# Patient Record
Sex: MALE | Race: WHITE | Employment: PART TIME | URBAN - METROPOLITAN AREA
[De-identification: names, ages, dates, MRNs, and addresses within clinical notes are randomized per-mention and may not be internally consistent; named-entity substitution may affect disease eponyms.]

---

## 2018-08-28 ENCOUNTER — HOSPITAL ENCOUNTER (OUTPATIENT)
Age: 48
Discharge: HOME | End: 2018-08-28
Payer: COMMERCIAL

## 2018-08-28 LAB
ALBUMIN SERPL-MCNC: 4.1 G/DL (ref 3.5–5.2)
ALP BLD-CCNC: 61 U/L (ref 40–129)
ALT SERPL-CCNC: 19 U/L (ref 0–40)
ANION GAP SERPL CALCULATED.3IONS-SCNC: 14 MMOL/L (ref 7–16)
AST SERPL-CCNC: 19 U/L (ref 0–39)
BASOPHILS ABSOLUTE: 0.04 E9/L (ref 0–0.2)
BASOPHILS RELATIVE PERCENT: 0.6 % (ref 0–2)
BILIRUB SERPL-MCNC: 0.7 MG/DL (ref 0–1.2)
BILIRUBIN URINE: NEGATIVE
BLOOD, URINE: NEGATIVE
BUN BLDV-MCNC: 16 MG/DL (ref 6–20)
CALCIUM SERPL-MCNC: 8.8 MG/DL (ref 8.6–10.2)
CHLORIDE BLD-SCNC: 102 MMOL/L (ref 98–107)
CHOLESTEROL, FASTING: 197 MG/DL (ref 0–199)
CLARITY: CLEAR
CO2: 24 MMOL/L (ref 22–29)
COLOR: YELLOW
CREAT SERPL-MCNC: 0.9 MG/DL (ref 0.7–1.2)
EOSINOPHILS ABSOLUTE: 0.1 E9/L (ref 0.05–0.5)
EOSINOPHILS RELATIVE PERCENT: 1.4 % (ref 0–6)
GFR AFRICAN AMERICAN: >60
GFR NON-AFRICAN AMERICAN: >60 ML/MIN/1.73
GLUCOSE FASTING: 105 MG/DL (ref 74–109)
GLUCOSE URINE: NEGATIVE MG/DL
HCT VFR BLD CALC: 47 % (ref 37–54)
HDLC SERPL-MCNC: 36 MG/DL
HEMOGLOBIN: 16 G/DL (ref 12.5–16.5)
IMMATURE GRANULOCYTES #: 0.01 E9/L
IMMATURE GRANULOCYTES %: 0.1 % (ref 0–5)
KETONES, URINE: NEGATIVE MG/DL
LDL CHOLESTEROL CALCULATED: 116 MG/DL (ref 0–99)
LEUKOCYTE ESTERASE, URINE: NEGATIVE
LYMPHOCYTES ABSOLUTE: 2.87 E9/L (ref 1.5–4)
LYMPHOCYTES RELATIVE PERCENT: 40.4 % (ref 20–42)
MCH RBC QN AUTO: 27.8 PG (ref 26–35)
MCHC RBC AUTO-ENTMCNC: 34 % (ref 32–34.5)
MCV RBC AUTO: 81.7 FL (ref 80–99.9)
MONOCYTES ABSOLUTE: 0.45 E9/L (ref 0.1–0.95)
MONOCYTES RELATIVE PERCENT: 6.3 % (ref 2–12)
NEUTROPHILS ABSOLUTE: 3.63 E9/L (ref 1.8–7.3)
NEUTROPHILS RELATIVE PERCENT: 51.2 % (ref 43–80)
NITRITE, URINE: NEGATIVE
PDW BLD-RTO: 12.8 FL (ref 11.5–15)
PH UA: 6 (ref 5–9)
PLATELET # BLD: 279 E9/L (ref 130–450)
PMV BLD AUTO: 9.5 FL (ref 7–12)
POTASSIUM SERPL-SCNC: 4.2 MMOL/L (ref 3.5–5)
PROSTATE SPECIFIC ANTIGEN: 0.58 NG/ML (ref 0–4)
PROTEIN UA: NEGATIVE MG/DL
RBC # BLD: 5.75 E12/L (ref 3.8–5.8)
SODIUM BLD-SCNC: 140 MMOL/L (ref 132–146)
SPECIFIC GRAVITY UA: 1.02 (ref 1–1.03)
TOTAL PROTEIN: 7 G/DL (ref 6.4–8.3)
TRIGLYCERIDE, FASTING: 223 MG/DL (ref 0–149)
TSH SERPL DL<=0.05 MIU/L-ACNC: 1.46 UIU/ML (ref 0.27–4.2)
UROBILINOGEN, URINE: 0.2 E.U./DL
VITAMIN D 25-HYDROXY: 26 NG/ML (ref 30–100)
VLDLC SERPL CALC-MCNC: 45 MG/DL
WBC # BLD: 7.1 E9/L (ref 4.5–11.5)

## 2018-08-28 PROCEDURE — 80061 LIPID PANEL: CPT

## 2018-08-28 PROCEDURE — 36415 COLL VENOUS BLD VENIPUNCTURE: CPT

## 2018-08-28 PROCEDURE — 85025 COMPLETE CBC W/AUTO DIFF WBC: CPT

## 2018-08-28 PROCEDURE — 82306 VITAMIN D 25 HYDROXY: CPT

## 2018-08-28 PROCEDURE — 84443 ASSAY THYROID STIM HORMONE: CPT

## 2018-08-28 PROCEDURE — 81003 URINALYSIS AUTO W/O SCOPE: CPT

## 2018-08-28 PROCEDURE — 80053 COMPREHEN METABOLIC PANEL: CPT

## 2018-08-28 PROCEDURE — G0103 PSA SCREENING: HCPCS

## 2019-08-19 ENCOUNTER — HOSPITAL ENCOUNTER (OUTPATIENT)
Dept: PHYSICAL THERAPY | Age: 49
Setting detail: THERAPIES SERIES
Discharge: HOME | End: 2019-08-19
Payer: COMMERCIAL

## 2019-08-19 PROCEDURE — 97161 PT EVAL LOW COMPLEX 20 MIN: CPT | Performed by: PHYSICAL THERAPIST

## 2019-08-19 PROCEDURE — 97035 APP MDLTY 1+ULTRASOUND EA 15: CPT | Performed by: PHYSICAL THERAPIST

## 2019-08-19 PROCEDURE — 97110 THERAPEUTIC EXERCISES: CPT | Performed by: PHYSICAL THERAPIST

## 2019-08-19 NOTE — PROGRESS NOTES
by 1/3 mm grade  4. Independent with Home Exercise Programs      Long Term goals (6 weeks)  1. Decrease reported pain to 0-2/10  2. Increase ROM to WNL  3. Increase Strength to WNL   4. Able to perform/complete the following functions/tasks: lifting, pushing, pulling, grooming, bathing, reaching  5. Independent with Home Exercise Programs    Outcome Measure:  DASH= 41, 68%    Rehab Potential: [x] Good  [] Fair  [] Poor    PLAN       Treatment Plan:  [x] Therapeutic Exercise  [x] Therapeutic Activity  [x] Neuromuscular Re-education   [] Gait Training  [] Balance Training  [] Aerobic conditioning  [] Manual Therapy  [] Massage/Fascial release   [] Work/Sport specific activities   [x] Cold/hotpack  [] Vasocompression  [] Electrical Stimulation  [] Lumbar/Cervical Traction  [x] Ultrasound   [] Iontophoresis: 4 mg/mL Dexamethasone Sodium Phosphate 40-80 mAmin    [x] Instruction in HEP      []  Medication allergies reviewed for use of Dexamethasone Sodium Phosphate 4mg/ml  with iontophoresis treatments. Patient is not allergic. Suggested Professional Referral: [x] No  [] Yes:     Patient Education:  [x] Plans/Goals, Risks/Benefits discussed  [x] Home exercise program  Method of Education: [x] Verbal  [x] Demo  [x] Written  Comprehension of Education:  [x] Verbalizes understanding. [x] Demonstrates understanding. [] Needs Review. [] Demonstrates/verbalizes understanding of HEP/Ed previously given. Frequency:  2 times per week for  6 weeks    Patient understands diagnosis/prognosis and consents to treatment, plan and goals: [x] Yes    [] No     Thank you for the opportunity to work with your patient. If you have questions or comments, please contact me at [phone]; fax: [phone].     Electronically signed by: Zara Knight PT         Please sign Physician's Certification and return to:  [address] [place] [address] [place] [address]  Dept: [phone]  Dept Fax: [phone]  Loc: [phone]    [id number] Certification / Comments     Frequency/Duration  2/ week for  6 weeks. Certification period From: 8/19/2019  To: 10/19/2019    I have reviewed the Plan of Care established for skilled therapy services and certify that the services are required and that they will be provided while the patient is under my care.     Physician's Comments/Revisions:               Physician's Printed Name:                                           [de-identified] Signature:                                                               Date:

## 2019-08-23 ENCOUNTER — HOSPITAL ENCOUNTER (OUTPATIENT)
Dept: PHYSICAL THERAPY | Age: 49
Setting detail: THERAPIES SERIES
Discharge: HOME | End: 2019-08-23
Payer: COMMERCIAL

## 2019-08-23 PROCEDURE — 97110 THERAPEUTIC EXERCISES: CPT | Performed by: PHYSICAL THERAPIST

## 2019-08-23 PROCEDURE — 97035 APP MDLTY 1+ULTRASOUND EA 15: CPT | Performed by: PHYSICAL THERAPIST

## 2019-08-23 PROCEDURE — 97530 THERAPEUTIC ACTIVITIES: CPT | Performed by: PHYSICAL THERAPIST

## 2019-08-23 NOTE — PROGRESS NOTES
Physical Therapy Daily Treatment Note    Date: 2019  Patient Name: Marcelle Washburn  : 1970   MRN: [id number]     Referring Provider: Edith Hinojosa MD  [address], [address]     Medical Diagnosis: Left TOS, lateral epicondylitis    Outcome Measure:  DASH= 41, 68%  S: Pt reports compliance with HEP. Pt has a extremely difficult time supinating LUE  O:  DX Left TOS, lateral epicondylitis 2x/wk, 6 weeks   Time in / time out 13:00-13:53    Visit #/Visits remaining 2/10               pain 6/10     left Lateral epicondyle  1MHZ/1.3 w/cm2/pw X 8 min    THEREX           Biceps curls G x 20    Triceps G x 20    Shrugs          ER     IR     THERAPEUTIC ACTIVITY Functional activities   To aid in ROM and strength needed for reaching , lifting ,pushing and pulling at home/work    Large, functional, dynamic, global movements used to build strength, balance, endurance, and flexibility and to improve physical performance. Mid row G x 20    Low Row     Punches G x 20    Sh flex DB     Sh abd DB     WRIST DB  Flex  Ext  Sup/pron  UD  RD   UA  3# x 20  3# x 20  G x 20  3# x 20                                         A:  Tolerated well. Above added to written HEP.   P: Continue with rehab plan  Tanya Cason PT    Treatment Charges: Mins Units   Initial Evaluation      Re-Evaluation     Ther Exercise         TE 35  2    Manual Therapy     MT     Ther Activities        TA 10 1   Gait Training          GT     Neuro Re-education NR     Modalities 8  1    Non-Billable Service Time     Other     Total Time/Units  53 4

## 2019-08-26 ENCOUNTER — HOSPITAL ENCOUNTER (OUTPATIENT)
Dept: PHYSICAL THERAPY | Age: 49
Setting detail: THERAPIES SERIES
Discharge: HOME | End: 2019-08-26
Payer: COMMERCIAL

## 2019-08-26 PROCEDURE — 97110 THERAPEUTIC EXERCISES: CPT | Performed by: PHYSICAL THERAPIST

## 2019-08-26 PROCEDURE — 97035 APP MDLTY 1+ULTRASOUND EA 15: CPT | Performed by: PHYSICAL THERAPIST

## 2019-08-26 PROCEDURE — 97530 THERAPEUTIC ACTIVITIES: CPT | Performed by: PHYSICAL THERAPIST

## 2019-08-26 NOTE — PROGRESS NOTES
Physical Therapy Daily Treatment Note    Date: 2019  Patient Name: Kristen Little  : 1970   MRN: [id number]     Referring Provider: Karla Wilkins MD  [address], [address]     Medical Diagnosis: Left TOS, lateral epicondylitis    Outcome Measure:  DASH= 41, 68%  S: Pt reports compliance with HEP. Pt has a extremely difficult time supinating LUE  O:  DX Left TOS, lateral epicondylitis 2x/wk, 6 weeks   Time in / time out 11:00-11:43    Visit #/Visits remaining 3/9               pain 6/10    US left Lateral epicondyle  1MHZ/1.3 w/cm2/pw X 8 min    THEREX           Biceps curls G x 20    Triceps G x 20    Shrugs          ER G x 20    IR     THERAPEUTIC ACTIVITY Functional activities   To aid in ROM and strength needed for reaching , lifting ,pushing and pulling at home/work    Large, functional, dynamic, global movements used to build strength, balance, endurance, and flexibility and to improve physical performance. Mid row G x 20    Low Row     Punches G x 20    Sh flex DB     Sh abd DB     WRIST DB  Flex  Ext  Sup/pron  UD  RD   UA  3# x 20  3# x 20  G x 20  3# x 20                                         A:  Tolerated fair. Above added to written HEP.   P: Continue with rehab plan  Oral REJI Hunter    Treatment Charges: Mins Units   Initial Evaluation      Re-Evaluation     Ther Exercise         TE 12 1   Manual Therapy     MT     Ther Activities        TA 10 1   Gait Training          GT     Neuro Re-education NR     Modalities 8  1    Non-Billable Service Time     Other     Total Time/Units   42 3

## 2019-08-30 ENCOUNTER — HOSPITAL ENCOUNTER (OUTPATIENT)
Dept: PHYSICAL THERAPY | Age: 49
Setting detail: THERAPIES SERIES
Discharge: HOME | End: 2019-08-30
Payer: COMMERCIAL

## 2019-08-30 PROCEDURE — 97035 APP MDLTY 1+ULTRASOUND EA 15: CPT | Performed by: PHYSICAL THERAPIST

## 2019-08-30 PROCEDURE — 97530 THERAPEUTIC ACTIVITIES: CPT | Performed by: PHYSICAL THERAPIST

## 2019-08-30 PROCEDURE — 97110 THERAPEUTIC EXERCISES: CPT | Performed by: PHYSICAL THERAPIST

## 2019-08-30 NOTE — PROGRESS NOTES
Dfg]\  *+-    Physical Therapy Daily Treatment Note    Date: 2019  Patient Name: Quinton Lopez  : 1970   MRN: [id number]     Referring Provider: Arie Velasquez MD  [address], [address]     Medical Diagnosis: Left TOS, lateral epicondylitis    Outcome Measure:  DASH= 41, 68%  S: Pt reports compliance with HEP. Pt has a extremely difficult time supinating LUE  O:  DX Left TOS, lateral epicondylitis 2x/wk, 6 weeks   Time in / time out 13:00-13:50    Visit #/Visits remaining                pain 6/10    US left Lateral epicondyle  1MHZ/1.3 w/cm2/pw X 8 min    THEREX           Biceps curls G x 20    Triceps G x 20    Shrugs          ER G x 20    IR     THERAPEUTIC ACTIVITY Functional activities   To aid in ROM and strength needed for reaching , lifting ,pushing and pulling at home/work    Large, functional, dynamic, global movements used to build strength, balance, endurance, and flexibility and to improve physical performance. Mid row G x 20    Low Row     Punches G x 20    Sh flex DB     Sh abd DB     WRIST DB  Flex  Ext  Sup/pron  UD  RD   UA  4# x 20  4# x 20  G x 20  4# x 20                                         A:  Tolerated fair. Above added to written HEP.   P: Continue with rehab plan  Kike Gutierrez PT    Treatment Charges: Mins Units   Initial Evaluation      Re-Evaluation     Ther Exercise         TE 12 1   Manual Therapy     MT     Ther Activities        TA 30 1   Gait Training          GT     Neuro Re-education NR     Modalities 8  1    Non-Billable Service Time     Other     Total Time/Units   50 3

## 2019-09-04 ENCOUNTER — HOSPITAL ENCOUNTER (OUTPATIENT)
Dept: PHYSICAL THERAPY | Age: 49
Setting detail: THERAPIES SERIES
Discharge: HOME | End: 2019-09-04
Payer: COMMERCIAL

## 2019-09-04 PROCEDURE — 97110 THERAPEUTIC EXERCISES: CPT | Performed by: PHYSICAL THERAPIST

## 2019-09-04 PROCEDURE — 97035 APP MDLTY 1+ULTRASOUND EA 15: CPT | Performed by: PHYSICAL THERAPIST

## 2019-09-04 PROCEDURE — 97530 THERAPEUTIC ACTIVITIES: CPT | Performed by: PHYSICAL THERAPIST

## 2019-09-06 ENCOUNTER — HOSPITAL ENCOUNTER (OUTPATIENT)
Dept: PHYSICAL THERAPY | Age: 49
Setting detail: THERAPIES SERIES
Discharge: HOME | End: 2019-09-06
Payer: COMMERCIAL

## 2019-09-06 PROCEDURE — 97035 APP MDLTY 1+ULTRASOUND EA 15: CPT | Performed by: PHYSICAL THERAPIST

## 2019-09-06 PROCEDURE — 97110 THERAPEUTIC EXERCISES: CPT | Performed by: PHYSICAL THERAPIST

## 2019-09-06 PROCEDURE — 97530 THERAPEUTIC ACTIVITIES: CPT | Performed by: PHYSICAL THERAPIST

## 2019-09-11 ENCOUNTER — HOSPITAL ENCOUNTER (OUTPATIENT)
Age: 49
Discharge: HOME | End: 2019-09-11
Payer: COMMERCIAL

## 2019-09-11 ENCOUNTER — HOSPITAL ENCOUNTER (OUTPATIENT)
Dept: PHYSICAL THERAPY | Age: 49
Setting detail: THERAPIES SERIES
Discharge: HOME | End: 2019-09-11
Payer: COMMERCIAL

## 2019-09-11 LAB
ALBUMIN SERPL-MCNC: 4.4 G/DL (ref 3.5–5.2)
ALP BLD-CCNC: 72 U/L (ref 40–129)
ALT SERPL-CCNC: 17 U/L (ref 0–40)
ANION GAP SERPL CALCULATED.3IONS-SCNC: 11 MMOL/L (ref 7–16)
AST SERPL-CCNC: 14 U/L (ref 0–39)
BASOPHILS ABSOLUTE: 0.02 E9/L (ref 0–0.2)
BASOPHILS RELATIVE PERCENT: 0.2 % (ref 0–2)
BILIRUB SERPL-MCNC: 0.4 MG/DL (ref 0–1.2)
BILIRUBIN URINE: NEGATIVE
BLOOD, URINE: NEGATIVE
BUN BLDV-MCNC: 14 MG/DL (ref 6–20)
CALCIUM SERPL-MCNC: 8.9 MG/DL (ref 8.6–10.2)
CHLORIDE BLD-SCNC: 104 MMOL/L (ref 98–107)
CHOLESTEROL, FASTING: 173 MG/DL (ref 0–199)
CLARITY: CLEAR
CO2: 27 MMOL/L (ref 22–29)
COLOR: YELLOW
CREAT SERPL-MCNC: 0.9 MG/DL (ref 0.7–1.2)
EOSINOPHILS ABSOLUTE: 0.18 E9/L (ref 0.05–0.5)
EOSINOPHILS RELATIVE PERCENT: 2 % (ref 0–6)
GFR AFRICAN AMERICAN: >60
GFR NON-AFRICAN AMERICAN: >60 ML/MIN/1.73
GLUCOSE FASTING: 113 MG/DL (ref 74–99)
GLUCOSE URINE: NEGATIVE MG/DL
HCT VFR BLD CALC: 43.9 % (ref 37–54)
HDLC SERPL-MCNC: 27 MG/DL
HEMOGLOBIN: 14.8 G/DL (ref 12.5–16.5)
IMMATURE GRANULOCYTES #: 0.04 E9/L
IMMATURE GRANULOCYTES %: 0.4 % (ref 0–5)
KETONES, URINE: NEGATIVE MG/DL
LDL CHOLESTEROL CALCULATED: 67 MG/DL (ref 0–99)
LEUKOCYTE ESTERASE, URINE: NEGATIVE
LYMPHOCYTES ABSOLUTE: 2.62 E9/L (ref 1.5–4)
LYMPHOCYTES RELATIVE PERCENT: 29.3 % (ref 20–42)
MCH RBC QN AUTO: 27.3 PG (ref 26–35)
MCHC RBC AUTO-ENTMCNC: 33.7 % (ref 32–34.5)
MCV RBC AUTO: 80.8 FL (ref 80–99.9)
MONOCYTES ABSOLUTE: 0.58 E9/L (ref 0.1–0.95)
MONOCYTES RELATIVE PERCENT: 6.5 % (ref 2–12)
NEUTROPHILS ABSOLUTE: 5.5 E9/L (ref 1.8–7.3)
NEUTROPHILS RELATIVE PERCENT: 61.6 % (ref 43–80)
NITRITE, URINE: NEGATIVE
PDW BLD-RTO: 12.7 FL (ref 11.5–15)
PH UA: 5 (ref 5–9)
PLATELET # BLD: 299 E9/L (ref 130–450)
PMV BLD AUTO: 9.4 FL (ref 7–12)
POTASSIUM SERPL-SCNC: 4.3 MMOL/L (ref 3.5–5)
PROSTATE SPECIFIC ANTIGEN: 0.56 NG/ML (ref 0–4)
PROTEIN UA: NEGATIVE MG/DL
RBC # BLD: 5.43 E12/L (ref 3.8–5.8)
SODIUM BLD-SCNC: 142 MMOL/L (ref 132–146)
SPECIFIC GRAVITY UA: 1.02 (ref 1–1.03)
TOTAL PROTEIN: 7.1 G/DL (ref 6.4–8.3)
TRIGLYCERIDE, FASTING: 396 MG/DL (ref 0–149)
TSH SERPL DL<=0.05 MIU/L-ACNC: 1.78 UIU/ML (ref 0.27–4.2)
UROBILINOGEN, URINE: 0.2 E.U./DL
VITAMIN D 25-HYDROXY: 31 NG/ML (ref 30–100)
VLDLC SERPL CALC-MCNC: 79 MG/DL
WBC # BLD: 8.9 E9/L (ref 4.5–11.5)

## 2019-09-11 PROCEDURE — 82306 VITAMIN D 25 HYDROXY: CPT

## 2019-09-11 PROCEDURE — 81003 URINALYSIS AUTO W/O SCOPE: CPT

## 2019-09-11 PROCEDURE — 84443 ASSAY THYROID STIM HORMONE: CPT

## 2019-09-11 PROCEDURE — 97110 THERAPEUTIC EXERCISES: CPT

## 2019-09-11 PROCEDURE — 97035 APP MDLTY 1+ULTRASOUND EA 15: CPT

## 2019-09-11 PROCEDURE — G0103 PSA SCREENING: HCPCS

## 2019-09-11 PROCEDURE — 80053 COMPREHEN METABOLIC PANEL: CPT

## 2019-09-11 PROCEDURE — 36415 COLL VENOUS BLD VENIPUNCTURE: CPT

## 2019-09-11 PROCEDURE — 85025 COMPLETE CBC W/AUTO DIFF WBC: CPT

## 2019-09-11 PROCEDURE — 80061 LIPID PANEL: CPT

## 2019-09-11 PROCEDURE — 97530 THERAPEUTIC ACTIVITIES: CPT

## 2019-09-13 ENCOUNTER — HOSPITAL ENCOUNTER (OUTPATIENT)
Dept: PHYSICAL THERAPY | Age: 49
Setting detail: THERAPIES SERIES
Discharge: HOME | End: 2019-09-13
Payer: COMMERCIAL

## 2019-09-13 PROCEDURE — 97110 THERAPEUTIC EXERCISES: CPT

## 2019-09-13 PROCEDURE — 97530 THERAPEUTIC ACTIVITIES: CPT

## 2019-09-13 PROCEDURE — 97035 APP MDLTY 1+ULTRASOUND EA 15: CPT

## 2019-09-16 ENCOUNTER — HOSPITAL ENCOUNTER (OUTPATIENT)
Dept: PHYSICAL THERAPY | Age: 49
Setting detail: THERAPIES SERIES
Discharge: HOME | End: 2019-09-16
Payer: COMMERCIAL

## 2019-09-16 PROCEDURE — 97035 APP MDLTY 1+ULTRASOUND EA 15: CPT

## 2019-09-16 PROCEDURE — 97530 THERAPEUTIC ACTIVITIES: CPT

## 2019-09-16 PROCEDURE — 97110 THERAPEUTIC EXERCISES: CPT

## 2019-09-20 ENCOUNTER — HOSPITAL ENCOUNTER (OUTPATIENT)
Dept: PHYSICAL THERAPY | Age: 49
Setting detail: THERAPIES SERIES
Discharge: HOME | End: 2019-09-20
Payer: COMMERCIAL

## 2019-09-20 PROCEDURE — 97110 THERAPEUTIC EXERCISES: CPT | Performed by: PHYSICAL THERAPIST

## 2019-09-20 PROCEDURE — 97530 THERAPEUTIC ACTIVITIES: CPT | Performed by: PHYSICAL THERAPIST

## 2019-09-25 ENCOUNTER — HOSPITAL ENCOUNTER (OUTPATIENT)
Dept: PHYSICAL THERAPY | Age: 49
Setting detail: THERAPIES SERIES
Discharge: HOME | End: 2019-09-25
Payer: COMMERCIAL

## 2019-09-25 PROCEDURE — 97110 THERAPEUTIC EXERCISES: CPT

## 2019-09-25 PROCEDURE — 97530 THERAPEUTIC ACTIVITIES: CPT

## 2019-09-27 ENCOUNTER — HOSPITAL ENCOUNTER (OUTPATIENT)
Dept: PHYSICAL THERAPY | Age: 49
Setting detail: THERAPIES SERIES
Discharge: HOME | End: 2019-09-27
Payer: COMMERCIAL

## 2019-09-27 PROCEDURE — 97530 THERAPEUTIC ACTIVITIES: CPT | Performed by: PHYSICAL THERAPIST

## 2019-09-27 PROCEDURE — 97110 THERAPEUTIC EXERCISES: CPT | Performed by: PHYSICAL THERAPIST

## 2019-09-27 NOTE — FLOWSHEET NOTE
Physical Therapy Daily Treatment Note  Date: 2019  Patient Name: Quinton Lopez  : 1970   MRN: [id number]   Referring Provider: Arie Velasquez MD  [address], [address]     Medical Diagnosis: Left TOS, lateral epicondylitis    Outcome Measure:  DASH= 41, 68%    S: Pt. states he got a letter from his insurance company saying they approved him to have an MRI. He has been taking an anti inflammatory and is now able to supinate his forearm, overall less pain. O:  DX Left TOS, lateral epicondylitis 2x/wk, 6 weeks   Time in / time out [id number]    Visit #/Visits remaining 12/0    pain 3/10     UBE L3  2 min fwd/2 min BKWD    There Ex     Biceps curls G x 20    Triceps G x 20    Shrugs     ER G x 20 LUE    IR     THERAPEUTIC ACTIVITY Functional activities   To aid in ROM and strength needed for reaching , lifting ,pushing and pulling at home/work    Large, functional, dynamic, global movements used to build strength, balance, endurance, and flexibility and to improve physical performance. Mid row G x 20    Pull Down G x 20    Low Row G x 20    Punches G x 20    With elbow supported:  Bicep curl  Neutral curls   3# x 20  3# x 20    WRIST DB  Flex  Ext  Sup/pron  UD  RD     4# x 20  4# x 20  G x 20  4# x 20    A:  Tolerated well. Supination is better today, pain is less. P: Continue with rehab plan.   Kike Gutierrez, PT,PTA    Treatment Charges: Mins Units   Initial Evaluation      Re-Evaluation     Ther Exercise         TE 18 1   Manual Therapy     MT     Ther Activities        TA 12 1   Gait Training          GT     Neuro Re-education NR     Modalities        Non-Billable Service Time     Other     Total Time/Units 31 2

## 2019-10-21 ENCOUNTER — HOSPITAL ENCOUNTER (OUTPATIENT)
Dept: MRI IMAGING | Age: 49
Discharge: HOME | End: 2019-10-23
Payer: COMMERCIAL

## 2019-10-21 ENCOUNTER — HOSPITAL ENCOUNTER (OUTPATIENT)
Dept: INTERVENTIONAL RADIOLOGY/VASCULAR | Age: 49
Discharge: HOME | End: 2019-10-21
Payer: COMMERCIAL

## 2019-10-21 DIAGNOSIS — M25.522: ICD-10-CM

## 2019-10-21 DIAGNOSIS — M75.22 BICEPS TENDINITIS ON LEFT: ICD-10-CM

## 2019-10-21 DIAGNOSIS — M75.21 BICEPS TENDINITIS OF BOTH SHOULDERS: ICD-10-CM

## 2019-10-21 DIAGNOSIS — M75.22 BICEPS TENDINITIS OF BOTH SHOULDERS: ICD-10-CM

## 2019-10-21 PROCEDURE — 73222 MRI JOINT UPR EXTREM W/DYE: CPT

## 2019-10-21 PROCEDURE — 77002 NEEDLE LOCALIZATION BY XRAY: CPT | Performed by: RADIOLOGY

## 2019-10-21 PROCEDURE — A9577 INJ MULTIHANCE: HCPCS | Performed by: RADIOLOGY

## 2019-10-21 PROCEDURE — 6360000004 HC RX CONTRAST MEDICATION: Performed by: RADIOLOGY

## 2019-10-21 PROCEDURE — 20610 DRAIN/INJ JOINT/BURSA W/O US: CPT | Performed by: RADIOLOGY

## 2019-10-21 RX ADMIN — IOPAMIDOL 3 ML: 612 INJECTION, SOLUTION INTRATHECAL at 12:27

## 2019-10-21 RX ADMIN — GADOBENATE DIMEGLUMINE 1 ML: 529 INJECTION, SOLUTION INTRAVENOUS at 12:30

## 2019-11-14 ENCOUNTER — HOSPITAL ENCOUNTER (OUTPATIENT)
Dept: MRI IMAGING | Age: 49
Discharge: HOME | End: 2019-11-16
Payer: COMMERCIAL

## 2019-11-14 DIAGNOSIS — M25.522 PAIN IN LEFT ELBOW: ICD-10-CM

## 2019-11-14 PROCEDURE — 73221 MRI JOINT UPR EXTREM W/O DYE: CPT

## 2020-04-07 ENCOUNTER — APPOINTMENT (OUTPATIENT)
Dept: ULTRASOUND IMAGING | Age: 50
End: 2020-04-07
Payer: COMMERCIAL

## 2020-04-07 ENCOUNTER — APPOINTMENT (OUTPATIENT)
Dept: GENERAL RADIOLOGY | Age: 50
End: 2020-04-07
Payer: COMMERCIAL

## 2020-04-07 ENCOUNTER — APPOINTMENT (OUTPATIENT)
Dept: INTERVENTIONAL RADIOLOGY/VASCULAR | Age: 50
End: 2020-04-07
Payer: COMMERCIAL

## 2020-04-07 ENCOUNTER — APPOINTMENT (OUTPATIENT)
Dept: CT IMAGING | Age: 50
End: 2020-04-07
Payer: COMMERCIAL

## 2020-04-07 ENCOUNTER — HOSPITAL ENCOUNTER (EMERGENCY)
Age: 50
Discharge: HOME | End: 2020-04-07
Attending: EMERGENCY MEDICINE
Payer: COMMERCIAL

## 2020-04-07 VITALS
WEIGHT: 265 LBS | HEART RATE: 120 BPM | TEMPERATURE: 98.7 F | BODY MASS INDEX: 34.01 KG/M2 | DIASTOLIC BLOOD PRESSURE: 68 MMHG | HEIGHT: 74 IN | OXYGEN SATURATION: 97 % | SYSTOLIC BLOOD PRESSURE: 119 MMHG | RESPIRATION RATE: 24 BRPM

## 2020-04-07 LAB
ALBUMIN SERPL-MCNC: 3.8 G/DL (ref 3.5–5.2)
ALP BLD-CCNC: 57 U/L (ref 40–129)
ALT SERPL-CCNC: 19 U/L (ref 0–40)
ANION GAP SERPL CALCULATED.3IONS-SCNC: 10 MMOL/L (ref 7–16)
ANISOCYTOSIS: ABNORMAL
APPEARANCE FLUID: NORMAL
APTT: 33.7 SEC (ref 24.5–35.1)
AST SERPL-CCNC: 17 U/L (ref 0–39)
BACTERIA: NORMAL /HPF
BASOPHILS ABSOLUTE: 0.07 E9/L (ref 0–0.2)
BASOPHILS RELATIVE PERCENT: 1.7 % (ref 0–2)
BILIRUB SERPL-MCNC: 0.3 MG/DL (ref 0–1.2)
BILIRUBIN URINE: NEGATIVE
BLOOD, URINE: ABNORMAL
BUN BLDV-MCNC: 6 MG/DL (ref 6–20)
CALCIUM SERPL-MCNC: 8.8 MG/DL (ref 8.6–10.2)
CELL COUNT FLUID TYPE: NORMAL
CHLORIDE BLD-SCNC: 105 MMOL/L (ref 98–107)
CLARITY: CLEAR
CO2: 26 MMOL/L (ref 22–29)
COLOR FLUID: YELLOW
COLOR: YELLOW
CREAT SERPL-MCNC: 0.8 MG/DL (ref 0.7–1.2)
EKG ATRIAL RATE: 114 BPM
EKG P AXIS: 43 DEGREES
EKG P-R INTERVAL: 154 MS
EKG Q-T INTERVAL: 332 MS
EKG QRS DURATION: 84 MS
EKG QTC CALCULATION (BAZETT): 457 MS
EKG R AXIS: 10 DEGREES
EKG T AXIS: 83 DEGREES
EKG VENTRICULAR RATE: 114 BPM
EOSINOPHILS ABSOLUTE: 0.04 E9/L (ref 0.05–0.5)
EOSINOPHILS RELATIVE PERCENT: 0.9 % (ref 0–6)
EPITHELIAL CELLS, UA: NORMAL /HPF
FLUID TYPE: NORMAL
GFR AFRICAN AMERICAN: >60
GFR NON-AFRICAN AMERICAN: >60 ML/MIN/1.73
GLUCOSE BLD-MCNC: 110 MG/DL (ref 74–99)
GLUCOSE URINE: NEGATIVE MG/DL
HCT VFR BLD CALC: 35 % (ref 37–54)
HEMOGLOBIN: 11.2 G/DL (ref 12.5–16.5)
HYPOCHROMIA: ABNORMAL
INR BLD: 1.2
KETONES, URINE: NEGATIVE MG/DL
LACTATE DEHYDROGENASE: 179 U/L (ref 135–225)
LACTIC ACID: 1.2 MMOL/L (ref 0.5–2.2)
LD, FLUID: 171 U/L
LEUKOCYTE ESTERASE, URINE: NEGATIVE
LYMPHOCYTES ABSOLUTE: 0.41 E9/L (ref 1.5–4)
LYMPHOCYTES RELATIVE PERCENT: 9.6 % (ref 20–42)
MCH RBC QN AUTO: 27.3 PG (ref 26–35)
MCHC RBC AUTO-ENTMCNC: 32 % (ref 32–34.5)
MCV RBC AUTO: 85.4 FL (ref 80–99.9)
MONOCYTE, FLUID: 95 %
MONOCYTES ABSOLUTE: 0.33 E9/L (ref 0.1–0.95)
MONOCYTES RELATIVE PERCENT: 7.8 % (ref 2–12)
NEUTROPHIL, FLUID: 5 %
NEUTROPHILS ABSOLUTE: 3.28 E9/L (ref 1.8–7.3)
NEUTROPHILS RELATIVE PERCENT: 80 % (ref 43–80)
NITRITE, URINE: NEGATIVE
NUCLEATED CELLS FLUID: 1216 /UL
OVALOCYTES: ABNORMAL
PDW BLD-RTO: 18.9 FL (ref 11.5–15)
PH UA: 6 (ref 5–9)
PLATELET # BLD: 193 E9/L (ref 130–450)
PMV BLD AUTO: 8.7 FL (ref 7–12)
POIKILOCYTES: ABNORMAL
POLYCHROMASIA: ABNORMAL
POTASSIUM SERPL-SCNC: 3.4 MMOL/L (ref 3.5–5)
PRO-BNP: 13 PG/ML (ref 0–125)
PROTEIN FLUID: 3.5 G/DL
PROTEIN UA: NEGATIVE MG/DL
PROTHROMBIN TIME: 14 SEC (ref 9.3–12.4)
RBC # BLD: 4.1 E12/L (ref 3.8–5.8)
RBC FLUID: 5000 /UL
RBC UA: NORMAL /HPF (ref 0–2)
SODIUM BLD-SCNC: 141 MMOL/L (ref 132–146)
SPECIFIC GRAVITY UA: <=1.005 (ref 1–1.03)
TOTAL PROTEIN: 6.7 G/DL (ref 6.4–8.3)
TROPONIN: <0.01 NG/ML (ref 0–0.03)
UROBILINOGEN, URINE: 0.2 E.U./DL
WBC # BLD: 4.1 E9/L (ref 4.5–11.5)
WBC UA: NORMAL /HPF (ref 0–5)

## 2020-04-07 PROCEDURE — 87205 SMEAR GRAM STAIN: CPT

## 2020-04-07 PROCEDURE — 2580000003 HC RX 258: Performed by: PHYSICIAN ASSISTANT

## 2020-04-07 PROCEDURE — 87040 BLOOD CULTURE FOR BACTERIA: CPT

## 2020-04-07 PROCEDURE — 80053 COMPREHEN METABOLIC PANEL: CPT

## 2020-04-07 PROCEDURE — 6360000004 HC RX CONTRAST MEDICATION: Performed by: RADIOLOGY

## 2020-04-07 PROCEDURE — 83615 LACTATE (LD) (LDH) ENZYME: CPT

## 2020-04-07 PROCEDURE — 96366 THER/PROPH/DIAG IV INF ADDON: CPT

## 2020-04-07 PROCEDURE — 32555 ASPIRATE PLEURA W/ IMAGING: CPT

## 2020-04-07 PROCEDURE — C1729 CATH, DRAINAGE: HCPCS

## 2020-04-07 PROCEDURE — 36415 COLL VENOUS BLD VENIPUNCTURE: CPT

## 2020-04-07 PROCEDURE — 6370000000 HC RX 637 (ALT 250 FOR IP): Performed by: PHYSICIAN ASSISTANT

## 2020-04-07 PROCEDURE — 6360000002 HC RX W HCPCS: Performed by: PHYSICIAN ASSISTANT

## 2020-04-07 PROCEDURE — 93005 ELECTROCARDIOGRAM TRACING: CPT | Performed by: PHYSICIAN ASSISTANT

## 2020-04-07 PROCEDURE — 71045 X-RAY EXAM CHEST 1 VIEW: CPT

## 2020-04-07 PROCEDURE — 71275 CT ANGIOGRAPHY CHEST: CPT

## 2020-04-07 PROCEDURE — 96365 THER/PROPH/DIAG IV INF INIT: CPT

## 2020-04-07 PROCEDURE — 84157 ASSAY OF PROTEIN OTHER: CPT

## 2020-04-07 PROCEDURE — 85025 COMPLETE CBC W/AUTO DIFF WBC: CPT

## 2020-04-07 PROCEDURE — 99285 EMERGENCY DEPT VISIT HI MDM: CPT

## 2020-04-07 PROCEDURE — 89051 BODY FLUID CELL COUNT: CPT

## 2020-04-07 PROCEDURE — 83880 ASSAY OF NATRIURETIC PEPTIDE: CPT

## 2020-04-07 PROCEDURE — 93971 EXTREMITY STUDY: CPT

## 2020-04-07 PROCEDURE — 84484 ASSAY OF TROPONIN QUANT: CPT

## 2020-04-07 PROCEDURE — 85730 THROMBOPLASTIN TIME PARTIAL: CPT

## 2020-04-07 PROCEDURE — 81001 URINALYSIS AUTO W/SCOPE: CPT

## 2020-04-07 PROCEDURE — 83605 ASSAY OF LACTIC ACID: CPT

## 2020-04-07 PROCEDURE — 87070 CULTURE OTHR SPECIMN AEROBIC: CPT

## 2020-04-07 PROCEDURE — 94640 AIRWAY INHALATION TREATMENT: CPT

## 2020-04-07 PROCEDURE — 85610 PROTHROMBIN TIME: CPT

## 2020-04-07 RX ORDER — CALCIUM CARBONATE 200(500)MG
3 TABLET,CHEWABLE ORAL DAILY
COMMUNITY

## 2020-04-07 RX ORDER — 0.9 % SODIUM CHLORIDE 0.9 %
1000 INTRAVENOUS SOLUTION INTRAVENOUS ONCE
Status: COMPLETED | OUTPATIENT
Start: 2020-04-07 | End: 2020-04-07

## 2020-04-07 RX ORDER — OXYCODONE AND ACETAMINOPHEN 10; 325 MG/1; MG/1
1 TABLET ORAL EVERY 4 HOURS PRN
COMMUNITY
End: 2020-04-07 | Stop reason: ALTCHOICE

## 2020-04-07 RX ORDER — DOXYCYCLINE HYCLATE 100 MG
100 TABLET ORAL 2 TIMES DAILY
Qty: 20 TABLET | Refills: 0 | Status: SHIPPED | OUTPATIENT
Start: 2020-04-07 | End: 2020-04-17

## 2020-04-07 RX ORDER — HYDRALAZINE HYDROCHLORIDE 20 MG/ML
10 INJECTION INTRAMUSCULAR; INTRAVENOUS ONCE
Status: DISCONTINUED | OUTPATIENT
Start: 2020-04-07 | End: 2020-04-07 | Stop reason: HOSPADM

## 2020-04-07 RX ORDER — OXYCODONE HYDROCHLORIDE 10 MG/1
10 TABLET ORAL EVERY 6 HOURS PRN
COMMUNITY

## 2020-04-07 RX ORDER — PREDNISONE 20 MG/1
TABLET ORAL
Qty: 40 TABLET | Refills: 0 | Status: SHIPPED | OUTPATIENT
Start: 2020-04-07 | End: 2020-04-17

## 2020-04-07 RX ORDER — CEFDINIR 300 MG/1
300 CAPSULE ORAL 2 TIMES DAILY
Qty: 20 CAPSULE | Refills: 0 | Status: SHIPPED | OUTPATIENT
Start: 2020-04-07 | End: 2020-04-17

## 2020-04-07 RX ORDER — FLUTICASONE PROPIONATE 50 MCG
1 SPRAY, SUSPENSION (ML) NASAL DAILY
COMMUNITY

## 2020-04-07 RX ORDER — IPRATROPIUM BROMIDE AND ALBUTEROL SULFATE 2.5; .5 MG/3ML; MG/3ML
1 SOLUTION RESPIRATORY (INHALATION) 3 TIMES DAILY
COMMUNITY

## 2020-04-07 RX ORDER — OMEPRAZOLE 20 MG/1
20 CAPSULE, DELAYED RELEASE ORAL NIGHTLY
COMMUNITY

## 2020-04-07 RX ORDER — TRAZODONE HYDROCHLORIDE 50 MG/1
100 TABLET ORAL NIGHTLY
COMMUNITY

## 2020-04-07 RX ORDER — IPRATROPIUM BROMIDE AND ALBUTEROL SULFATE 2.5; .5 MG/3ML; MG/3ML
1 SOLUTION RESPIRATORY (INHALATION) ONCE
Status: COMPLETED | OUTPATIENT
Start: 2020-04-07 | End: 2020-04-07

## 2020-04-07 RX ADMIN — CEFEPIME 2 G: 2 INJECTION, POWDER, FOR SOLUTION INTRAVENOUS at 13:04

## 2020-04-07 RX ADMIN — IPRATROPIUM BROMIDE AND ALBUTEROL SULFATE 1 AMPULE: 2.5; .5 SOLUTION RESPIRATORY (INHALATION) at 12:17

## 2020-04-07 RX ADMIN — IOPAMIDOL 75 ML: 755 INJECTION, SOLUTION INTRAVENOUS at 12:29

## 2020-04-07 RX ADMIN — SODIUM CHLORIDE 1000 ML: 9 INJECTION, SOLUTION INTRAVENOUS at 11:18

## 2020-04-07 NOTE — ED PROVIDER NOTES
helpful for differentiation. 5. Large right and trace to small left pleural effusions. Malignant effusion   in the setting of pleural metastatic disease is suspected on the left and may   also be present on the right. 6. Trace pericardial effusion no findings to suggest malignant effusion. US DUP LOWER EXTREMITY LEFT NOMI   Final Result   No evidence of DVT in the left lower extremity.             ----------------- NURSING NOTES AND VITALS REVIEWED ---------------   The nursing notes within the ED encounter and vital signs as below have been reviewed. /68   Pulse 120   Temp 98.7 °F (37.1 °C) (Oral)   Resp 24   Ht 6' 2\" (1.88 m)   Wt 265 lb (120.2 kg)   SpO2 97%   BMI 34.02 kg/m²   Oxygen Saturation Interpretation: Normal      --------------------------------PHYSICAL EXAM------------------------------------      Constitutional/General: Alert and oriented x3, well appearing, non toxic in NAD  Head: NC/AT  Eyes: PERRL, EOMI  Mouth: Oropharynx clear, handling secretions, no trismus  Neck: Supple, full ROM, no meningeal signs  Pulmonary: Lungs clear to auscultation bilaterally, no wheezes, rales, or rhonchi. Not in respiratory distress  Cardiovascular:  Tachycardia. No murmurs, gallops, or rubs. 2+ distal pulses  Abdomen: Soft, + BS. No distension. Nontender. No palpable rigidity, rebound or guarding  Extremities: Moves all extremities x 4. Warm and well perfused  Skin: warm and dry without rash  Neurologic: GCS 15,  Intact.   No focal deficits  Psych: Normal Affect      ------------------------ ED COURSE/MEDICAL DECISION MAKING----------------------  Medications   vancomycin 2 g in dextrose 5% 500 ml IVPB (has no administration in time range)   hydrALAZINE (APRESOLINE) injection 10 mg (10 mg Intravenous Not Given 4/7/20 1417)   0.9 % sodium chloride bolus (0 mLs Intravenous Stopped 4/7/20 1254)   ipratropium-albuterol (DUONEB) nebulizer solution 1 ampule (1 ampule Inhalation Given 4/7/20 1217)   iopamidol (ISOVUE-370) 76 % injection 75 mL (75 mLs Intravenous Given 4/7/20 1229)   cefepime (MAXIPIME) 2 g IVPB extended (mini-bag) (0 g Intravenous Stopped 4/7/20 1503)         Medical Decision Making:    No evidence of PE. Moderate to large right sided pleural effusion. Probably post obstructive pneumonia as well. Pt willing to have IR Thoracentesis here. Will send fluid off for analysis. Given IV abs here. He has low WBC but no neutropenia. Call out to his oncologist at [address] [facility] now. Prefer to keep him out of the hospital though technically this would be HCAP.       1400   Pt is back from IR. Feeling so much better at this time. He is very happy. Still with mild tachycardia. BP improved with larger cuff. Discussed case with his oncologist at [facility]. The oncologist feels patient can safely go home as long as O2 sats stay over 92%. Concern for drug induced pneumonitis with Keytruda. If he is not hypoxic patient can safely go home with Steroids and abs. Will follow next week. Pt remained tachycardic but did not desat here. No fevers. [id number] Sudha Gonzalez for discharge home with GEORGIE FONTANEZ and Doxycycline. Prednisone 80 mg daily per oncology as well. Will follow in office. Return if any increased symptoms. Counseling: The emergency provider has spoken with the patient and discussed todays results, in addition to providing specific details for the plan of care and counseling regarding the diagnosis and prognosis. Questions are answered at this time and they are agreeable with the plan.      ------------------------ IMPRESSION AND DISPOSITION -------------------------------    IMPRESSION  1. Pleural effusion on right    2. Pneumonia due to organism    3.  Malignant neoplasm of right lung, unspecified part of lung ([facility] [address]        DISPOSITION  Disposition: Discharge to home  Patient condition is stable                   Rambo Morales PA-C  04/07/20 0553

## 2020-04-07 NOTE — ED NOTES
The pt says he is getting chemo and immunotherapy for lung cancer with mets to pelvis and elbow. Last chemo was 3/10/20. He says he also has bronchitis but no fevers. He has noticed his left leg felt swollen 4/4/20.       Tramaine Corona RN  04/07/20 2612

## 2020-04-07 NOTE — ED NOTES
The patient was ambulated on room air about 150 feet. His heart rate was 131 and his pulse ox was 96%. At rest the patient's heart rate was 122 with a pulse ox of 122. The patient denied feeling short of breath. He tolerated the procedure well.       Tonny Melton RN  04/07/20 5156

## 2020-04-07 NOTE — ED NOTES
The patient also complains of mostly exertional dyspnea with a cough x almost 2 weeks. No fevers.       Robel Vazquez RN  04/07/20 6888

## 2020-04-08 ENCOUNTER — CARE COORDINATION (OUTPATIENT)
Dept: CASE MANAGEMENT | Age: 50
End: 2020-04-08

## 2020-04-08 LAB — GRAM STAIN ORDERABLE: NORMAL

## 2020-04-10 ENCOUNTER — CARE COORDINATION (OUTPATIENT)
Dept: CASE MANAGEMENT | Age: 50
End: 2020-04-10

## 2020-04-10 LAB
BODY FLUID CULTURE, STERILE: NORMAL
GRAM STAIN RESULT: NORMAL

## 2020-04-12 LAB
BLOOD CULTURE, ROUTINE: NORMAL
CULTURE, BLOOD 2: NORMAL